# Patient Record
Sex: FEMALE | Race: WHITE | Employment: FULL TIME
[De-identification: names, ages, dates, MRNs, and addresses within clinical notes are randomized per-mention and may not be internally consistent; named-entity substitution may affect disease eponyms.]

---

## 2017-01-04 ENCOUNTER — OFFICE VISIT (OUTPATIENT)
Dept: OBGYN | Facility: CLINIC | Age: 37
End: 2017-01-04

## 2017-01-04 VITALS
HEART RATE: 73 BPM | WEIGHT: 168 LBS | BODY MASS INDEX: 29.77 KG/M2 | HEIGHT: 63 IN | SYSTOLIC BLOOD PRESSURE: 121 MMHG | DIASTOLIC BLOOD PRESSURE: 83 MMHG

## 2017-01-04 DIAGNOSIS — Z01.419 ENCOUNTER FOR GYNECOLOGICAL EXAMINATION WITHOUT ABNORMAL FINDING: Primary | ICD-10-CM

## 2017-01-04 PROCEDURE — 99385 PREV VISIT NEW AGE 18-39: CPT | Performed by: OBSTETRICS & GYNECOLOGY

## 2017-01-04 RX ORDER — DOCUSATE SODIUM 100 MG/1
100 CAPSULE, LIQUID FILLED ORAL 2 TIMES DAILY
COMMUNITY

## 2017-01-04 RX ORDER — LORATADINE 10 MG/1
10 TABLET ORAL DAILY
COMMUNITY
End: 2020-11-09

## 2017-01-04 ASSESSMENT — ENCOUNTER SYMPTOMS
CONSTIPATION: 1
SHORTNESS OF BREATH: 0
EYE PAIN: 0
DIARRHEA: 1
ABDOMINAL PAIN: 0
COUGH: 0
EYE DISCHARGE: 0

## 2017-01-05 ENCOUNTER — TELEPHONE (OUTPATIENT)
Dept: OBGYN | Facility: CLINIC | Age: 37
End: 2017-01-05

## 2017-01-23 ENCOUNTER — TELEPHONE (OUTPATIENT)
Dept: OBGYN | Facility: CLINIC | Age: 37
End: 2017-01-23

## 2017-03-13 ENCOUNTER — EMPLOYEE WELLNESS (OUTPATIENT)
Dept: OTHER | Age: 37
End: 2017-03-13

## 2017-03-13 LAB
CHOLESTEROL/HDL RATIO: 3.5
CHOLESTEROL: 123 MG/DL
GLUCOSE BLD-MCNC: 101 MG/DL (ref 70–99)
HDLC SERPL-MCNC: 35 MG/DL
LDL CHOLESTEROL: 76 MG/DL (ref 0–130)
PATIENT FASTING?: YES
TRIGL SERPL-MCNC: 61 MG/DL
VLDLC SERPL CALC-MCNC: 12 MG/DL (ref 1–30)

## 2017-04-05 ENCOUNTER — OFFICE VISIT (OUTPATIENT)
Dept: OBGYN CLINIC | Age: 37
End: 2017-04-05

## 2017-04-05 ENCOUNTER — HOSPITAL ENCOUNTER (OUTPATIENT)
Age: 37
Setting detail: SPECIMEN
Discharge: HOME | End: 2017-04-05
Payer: COMMERCIAL

## 2017-04-05 VITALS
SYSTOLIC BLOOD PRESSURE: 118 MMHG | BODY MASS INDEX: 26.93 KG/M2 | WEIGHT: 152 LBS | HEIGHT: 63 IN | DIASTOLIC BLOOD PRESSURE: 84 MMHG | HEART RATE: 64 BPM

## 2017-04-05 DIAGNOSIS — R87.615 ENCOUNTER FOR REPEAT PAP SMEAR DUE TO PREVIOUS INSUFFICIENT CERVICAL CELLS: Primary | ICD-10-CM

## 2017-04-05 RX ORDER — MEDROXYPROGESTERONE ACETATE 150 MG/ML
150 INJECTION, SUSPENSION INTRAMUSCULAR ONCE
Qty: 1 ML | Refills: 3
Start: 2017-04-05 | End: 2017-04-06 | Stop reason: SDUPTHER

## 2017-04-05 ASSESSMENT — ENCOUNTER SYMPTOMS
ABDOMINAL PAIN: 0
SHORTNESS OF BREATH: 0
CONSTIPATION: 0
EYE PAIN: 0
COUGH: 0
PHOTOPHOBIA: 0

## 2017-04-06 RX ORDER — MEDROXYPROGESTERONE ACETATE 150 MG/ML
150 INJECTION, SUSPENSION INTRAMUSCULAR ONCE
Qty: 1 ML | Refills: 3 | Status: SHIPPED | OUTPATIENT
Start: 2017-04-06 | End: 2018-04-06 | Stop reason: SDUPTHER

## 2017-04-07 LAB — CYTOLOGY REPORT: NORMAL

## 2017-06-06 ENCOUNTER — TELEPHONE (OUTPATIENT)
Dept: OBGYN CLINIC | Age: 37
End: 2017-06-06

## 2018-03-20 VITALS — WEIGHT: 153 LBS | BODY MASS INDEX: 27.1 KG/M2

## 2018-04-06 ENCOUNTER — HOSPITAL ENCOUNTER (OUTPATIENT)
Age: 38
Setting detail: SPECIMEN
Discharge: HOME | End: 2018-04-06
Payer: COMMERCIAL

## 2018-04-06 ENCOUNTER — OFFICE VISIT (OUTPATIENT)
Dept: OBGYN CLINIC | Age: 38
End: 2018-04-06
Payer: COMMERCIAL

## 2018-04-06 VITALS
SYSTOLIC BLOOD PRESSURE: 125 MMHG | HEART RATE: 78 BPM | WEIGHT: 150 LBS | HEIGHT: 63 IN | DIASTOLIC BLOOD PRESSURE: 79 MMHG | BODY MASS INDEX: 26.58 KG/M2

## 2018-04-06 DIAGNOSIS — Z01.419 WOMEN'S ANNUAL ROUTINE GYNECOLOGICAL EXAMINATION: Primary | ICD-10-CM

## 2018-04-06 PROCEDURE — 99395 PREV VISIT EST AGE 18-39: CPT | Performed by: OBSTETRICS & GYNECOLOGY

## 2018-04-06 RX ORDER — MEDROXYPROGESTERONE ACETATE 150 MG/ML
150 INJECTION, SUSPENSION INTRAMUSCULAR ONCE
Qty: 1 ML | Refills: 3 | Status: SHIPPED | OUTPATIENT
Start: 2018-04-06 | End: 2020-11-09 | Stop reason: ALTCHOICE

## 2018-04-23 LAB — CYTOLOGY REPORT: NORMAL

## 2019-05-21 ENCOUNTER — OFFICE VISIT (OUTPATIENT)
Dept: OBGYN CLINIC | Age: 39
End: 2019-05-21
Payer: COMMERCIAL

## 2019-05-21 VITALS
WEIGHT: 160 LBS | HEIGHT: 63 IN | DIASTOLIC BLOOD PRESSURE: 78 MMHG | SYSTOLIC BLOOD PRESSURE: 117 MMHG | HEART RATE: 66 BPM | BODY MASS INDEX: 28.35 KG/M2

## 2019-05-21 DIAGNOSIS — Z12.31 ENCOUNTER FOR SCREENING MAMMOGRAM FOR BREAST CANCER: Primary | ICD-10-CM

## 2019-05-21 PROCEDURE — 99396 PREV VISIT EST AGE 40-64: CPT | Performed by: OBSTETRICS & GYNECOLOGY

## 2019-05-21 ASSESSMENT — ENCOUNTER SYMPTOMS
COUGH: 0
SHORTNESS OF BREATH: 0
BACK PAIN: 0
ABDOMINAL PAIN: 0

## 2019-05-21 NOTE — PROGRESS NOTES
[facility] PHYSICIANS  [facility] - Verito Gold [address] [place] [id number]  Dept: [phone]    Chief complaint:   Chief Complaint   Patient presents with    Gynecologic Exam     prev pap 2018, neg       History Present Illness: Lucho Lama is a 36 yo female who presents for her annual exam.  She has been on depo and wants to stop it. She has had some nausea and breast tenderness with spotting. She has been lethargic as well with some depression and she associates it with the depo as well. She isn't sure what she would like to do for her heavy periods, but knows that she doesn't want depo. She is occasionally sexually active. Her boyfriend lives in [place] so they are seeing each other long distance. She denies any vaginal discharge. She denies any bowel or bladder issues. Current Medications (OTC/Herbal):   Current Outpatient Medications   Medication Sig Dispense Refill    loratadine (CLARITIN) 10 MG tablet Take 10 mg by mouth daily      docusate sodium (COLACE) 100 MG capsule Take 100 mg by mouth 2 times daily      medroxyPROGESTERone (DEPO-PROVERA) 150 MG/ML injection Inject 1 mL into the muscle once for 1 dose 1 mL 3     No current facility-administered medications for this visit. Allergies: Allergies   Allergen Reactions    Pcn [Penicillins] Hives     Past Medical History: No past medical history on file.   Past Surgical History:   Past Surgical History:   Procedure Laterality Date    APPENDECTOMY      WISDOM TOOTH EXTRACTION       Obstetric History:   3  Para 3  Gynecologic History: LMP irregular with depo, just started 2 days ago  Last Pap: 18       Any history of abnormal paps no    PriorColpo/Biopsy n/a     Last Mammogram hasn't had one  Contraception: depo, doesn't want anymore  Complications: none  STDs: none  Psychosocial History: Occupation:   Works for [employer]   Caffeine No    At risk for depression No    Abuse:   No  Seatbelt:   Yes  Exercise: No    Social History     Socioeconomic History    Marital status:      Spouse name: Not on file    Number of children: Not on file    Years of education: Not on file    Highest education level: Not on file   Occupational History    Not on file   Social Needs    Financial resource strain: Not on file    Food insecurity:     Worry: Not on file     Inability: Not on file    Transportation needs:     Medical: Not on file     Non-medical: Not on file   Tobacco Use    Smoking status: Never Smoker    Smokeless tobacco: Never Used   Substance and Sexual Activity    Alcohol use: No    Drug use: No    Sexual activity: Not Currently     Partners: Male     Birth control/protection: Injection   Lifestyle    Physical activity:     Days per week: Not on file     Minutes per session: Not on file    Stress: Not on file   Relationships    Social connections:     Talks on phone: Not on file     Gets together: Not on file     Attends [employer] service: Not on file     Active member of club or organization: Not on file     Attends meetings of clubs or organizations: Not on file     Relationship status: Not on file    Intimate partner violence:     Fear of current or ex partner: Not on file     Emotionally abused: Not on file     Physically abused: Not on file     Forced sexual activity: Not on file   Other Topics Concern    Not on file   Social History Narrative    Not on file       Family History   Problem Relation Age of Onset    Hypertension Mother     Heart Disease Maternal Grandmother        Review of Systems:   Review of Systems   Constitutional: Negative for chills and fever. HENT: Negative for congestion. Respiratory: Negative for cough and shortness of breath. Cardiovascular: Negative for chest pain and palpitations. Gastrointestinal: Negative for abdominal pain. Genitourinary: Negative for dyspareunia and menstrual problem. Musculoskeletal: Negative for back pain.    Neurological: Negative for dizziness and light-headedness. Psychiatric/Behavioral: The patient is not nervous/anxious. Physical exam:  vitals:  Height   5  ft    3 in,  Weight    160 lbs,   117/78 BP  Gen: alert, no apparent distress  HEENT:No pathologic skin lesions noted,NC/AT,PERRL, normal midline nontender thyroid   Lung Exam: Clear to auscultation in all fields bilaterally, without wheezes,rales or rhonchi. Cardiac Exam: Normal sinus rhythm andrate, without murmurs, rubs or gallops appreciated. Breast Exam: Symmetric without pathological skin changes, nontender without discrete suspicious masses palpated, supraclavicular or axillary adenopathy or nippledischarge noted. Abdominal Exam: Nontender to deep palpation without organomegaly, masses or CVAT appreciated, BS positive. No spinal deformation or tenderness. External Genitalia: Normal development without vulvar,vaginal or cervical lesions noted. Normal vaginal discharge, uterus anterior, 4-6 weeks without CMT. Adnexa nontender without abnormal masses bilaterally. Rectal Exam: Omitted. Extremities: Nontender without clubbing, cyanosis or edema. F.R.O.M. Neurologic Exam: Grossly intact without noted sensorimotor deficits and oriented x 3. Assessment/Plan:   Unremarkable annual Gyn exam.    Cervical Cytology Evaluation begins at 24years old. If Negative Cytology, Follow-up screening per current guidelines. Mammograms every 1year. If 36 yo and last mammogram was negative. Birth control and barrier recommendations discussed. STD counseling and prevention reviewed. Routine health maintenance per patients PCP.   Pt to follow up for annual exam in 1 year     Maciej Casanova MD  [address]

## 2019-11-19 ENCOUNTER — IMMUNIZATION (OUTPATIENT)
Dept: LAB | Age: 39
End: 2019-11-19
Payer: COMMERCIAL

## 2019-11-19 PROCEDURE — 90686 IIV4 VACC NO PRSV 0.5 ML IM: CPT | Performed by: FAMILY MEDICINE

## 2019-11-19 PROCEDURE — 90471 IMMUNIZATION ADMIN: CPT | Performed by: FAMILY MEDICINE

## 2020-11-09 ENCOUNTER — VIRTUAL VISIT (OUTPATIENT)
Dept: FAMILY MEDICINE CLINIC | Age: 40
End: 2020-11-09
Payer: COMMERCIAL

## 2020-11-09 PROCEDURE — 99202 OFFICE O/P NEW SF 15 MIN: CPT | Performed by: FAMILY MEDICINE

## 2020-11-09 RX ORDER — CETIRIZINE HYDROCHLORIDE 10 MG/1
10 TABLET ORAL DAILY
COMMUNITY

## 2020-11-09 ASSESSMENT — ENCOUNTER SYMPTOMS
VOMITING: 0
EYE DISCHARGE: 0
CONSTIPATION: 0
TROUBLE SWALLOWING: 0
EYE REDNESS: 0
ABDOMINAL PAIN: 0
NAUSEA: 0
WHEEZING: 0
DIARRHEA: 0
SORE THROAT: 0
SINUS PRESSURE: 0
COUGH: 0
RHINORRHEA: 0
SHORTNESS OF BREATH: 0

## 2020-11-09 NOTE — PROGRESS NOTES
2020    TELEHEALTH EVALUATION -- Audio/Visual (During PSHIN-43 public health emergency)    HPI:    Reather Cranker (:  1980) has requested an audio/video evaluation for the following concern(s):    Reason for needing testing: Patient has had some mild congestion but has had it for awhile.  just found out that he was positive for COVID. She will need testing to determine ability to continue to work. Patient is seen today via video visit to discuss testing for COVID. We did discuss potential symptoms of COVID including cough, shortness of breath, fever, chills, body aches, headache, rashes, fatigue, nausea, vomiting, diarrhea and they are not having any of these listed symptoms. Did recommend that patient stay quarantined at home until testing reports are available. Review of Systems   Constitutional: Negative for chills, fatigue and fever. HENT: Negative for congestion, ear pain, postnasal drip, rhinorrhea, sinus pressure, sore throat and trouble swallowing. Eyes: Negative for discharge and redness. Respiratory: Negative for cough, shortness of breath and wheezing. Cardiovascular: Negative for chest pain. Gastrointestinal: Negative for abdominal pain, constipation, diarrhea, nausea and vomiting. Genitourinary: Negative for dysuria, flank pain, frequency and urgency. Musculoskeletal: Negative for arthralgias, myalgias and neck pain. Skin: Negative for rash and wound. Allergic/Immunologic: Negative for environmental allergies. Neurological: Negative for dizziness, weakness, light-headedness and headaches. Hematological: Negative for adenopathy. Psychiatric/Behavioral: Negative. Prior to Visit Medications    Medication Sig Taking?  Authorizing Provider   cetirizine (ZYRTEC) 10 MG tablet Take 10 mg by mouth daily Yes Historical Provider, MD   docusate sodium (COLACE) 100 MG capsule Take 100 mg by mouth 2 times daily Yes Historical Provider, MD       Social History     Tobacco Use    Smoking status: Never Smoker    Smokeless tobacco: Never Used   Substance Use Topics    Alcohol use: No    Drug use: No        Allergies   Allergen Reactions    Pcn [Penicillins] Hives   ,   History reviewed. No pertinent past medical history. ,   Past Surgical History:   Procedure Laterality Date    APPENDECTOMY      WISDOM TOOTH EXTRACTION         Physical Exam  Vitals signs and nursing note reviewed. Constitutional:       General: She is not in acute distress. Appearance: Normal appearance. HENT:      Head: Normocephalic and atraumatic. Right Ear: External ear normal.      Left Ear: External ear normal.      Nose: Nose normal. No congestion or rhinorrhea. Mouth/Throat:      Mouth: Mucous membranes are moist.   Eyes:      General:         Right eye: No discharge. Left eye: No discharge. Extraocular Movements: Extraocular movements intact. Conjunctiva/sclera: Conjunctivae normal.   Neck:      Musculoskeletal: Normal range of motion. Pulmonary:      Effort: Pulmonary effort is normal.   Skin:     Findings: No erythema or rash. Neurological:      Mental Status: She is alert and oriented to person, place, and time. Mental status is at baseline. ASSESSMENT/PLAN:  Encounter Diagnosis   Name Primary?  Exposure to COVID-19 virus Yes     No orders of the defined types were placed in this encounter. Orders Placed This Encounter   Procedures    COVID-19 Ambulatory     Rapid COVID test per workmed     Standing Status:   Future     Standing Expiration Date:   11/9/2021     Scheduling Instructions:      Saline media preferred given current shortage of viral transport media but both acceptable     Order Specific Question:   Is this test for diagnosis or screening? Answer:   Screening     Order Specific Question:   Symptomatic for COVID-19 as defined by CDC?      Answer:   No     Order Specific Question:   Date of Symptom Onset Answer:   N/A     Order Specific Question:   Hospitalized for COVID-19? Answer:   No     Order Specific Question:   Admitted to ICU for COVID-19? Answer:   No     Order Specific Question:   Employed in healthcare setting? Answer:   Yes     Order Specific Question:   Resident in a congregate (group) care setting? Answer:   No     Order Specific Question:   Pregnant? Answer:   No     Order Specific Question:   Previously tested for COVID-19? Answer:   No     Will await rapid test report before determining ability to continue work duties. Testing is ordered as noted and did schedule patient to come in to get testing performed. We will notify patient when report is available. In the interim would recommend quarantining for 10 days or until testing reports are available. If patient does develop symptoms at any time they should continue to remain quarantined until 10 days after the onset of symptoms and 24 hours symptom-free. Is advised that if worsening symptoms or concerning symptoms develop they should come in for further assessment. (Please note that portions of this note were completed with a voice recognition program. Efforts were made to edit the dictations but occasionally words are mis-transcribed.)        Carito Clark is a 38 y.o. female being evaluated by a Virtual Visit (video visit) encounter to address concerns as mentioned above. A caregiver was present when appropriate. Due to this being a TeleHealth encounter (During [place] [address] public health emergency), evaluation of the following organ systems was limited: Vitals/Constitutional/EENT/Resp/CV/GI//MS/Neuro/Skin/Heme-Lymph-Imm.   Pursuant to the emergency declaration under the [facility], [facility] waiver authority and the [employer] and [employer] Act, this Virtual Visit was conducted with patient's (and/or legal guardian's) consent, to reduce the patient's risk of exposure to COVID-19 and provide necessary medical care. The patient (and/or legal guardian) has also been advised to contact this office for worsening conditions or problems, and seek emergency medical treatment and/or call 911 if deemed necessary. Patient identification was verified at the start of the visit: Yes    Total time spent on this encounter: Not billed by time    Services were provided through a video synchronous discussion virtually to substitute for in-person clinic visit. Patient was in their home setting on their mobile device and I was in my office on a secured video interface. --Yolanda Stapleton DO on 11/9/2020 at 12:29 PM    An electronic signature was used to authenticate this note.

## 2020-11-10 ENCOUNTER — TELEPHONE (OUTPATIENT)
Dept: ADMINISTRATIVE | Age: 40
End: 2020-11-10

## 2020-11-17 ENCOUNTER — VIRTUAL VISIT (OUTPATIENT)
Dept: FAMILY MEDICINE CLINIC | Age: 40
End: 2020-11-17

## 2020-11-17 PROCEDURE — 99213 OFFICE O/P EST LOW 20 MIN: CPT | Performed by: FAMILY MEDICINE

## 2020-11-17 ASSESSMENT — ENCOUNTER SYMPTOMS
CONSTIPATION: 0
COUGH: 1
SORE THROAT: 0
DIARRHEA: 0
TROUBLE SWALLOWING: 0
EYE DISCHARGE: 0
RHINORRHEA: 1
EYE REDNESS: 0
SINUS PRESSURE: 0
NAUSEA: 0
VOMITING: 0
SHORTNESS OF BREATH: 0
WHEEZING: 0
ABDOMINAL PAIN: 0

## 2020-11-17 ASSESSMENT — PATIENT HEALTH QUESTIONNAIRE - PHQ9
SUM OF ALL RESPONSES TO PHQ QUESTIONS 1-9: 0
1. LITTLE INTEREST OR PLEASURE IN DOING THINGS: 0
SUM OF ALL RESPONSES TO PHQ9 QUESTIONS 1 & 2: 0
SUM OF ALL RESPONSES TO PHQ QUESTIONS 1-9: 0
SUM OF ALL RESPONSES TO PHQ QUESTIONS 1-9: 0
2. FEELING DOWN, DEPRESSED OR HOPELESS: 0

## 2020-11-17 NOTE — LETTER
[facility] department of [place] [address]  Phone: [phone]  Fax: Benjie Chamberlain,       November 17, 2020    Patient:   Cortney Meyers  Date of Birth   1980  Date of visit   11/17/2020        To Whom it May Concern:      Cortney Meyers was seen in my clinic on 11/17/2020. She may return to work 11/28/2020 without any restrictions. If you have any questions or concerns, please don't hesitate to call.       Sincerely,      Magi Martell, DO

## 2020-11-17 NOTE — PROGRESS NOTES
2020    TELEHEALTH EVALUATION -- Audio/Visual (During QHNMY-69 public health emergency)    HPI:    Linda Martínez (:  1980) has requested an audio/video evaluation for the following concern(s):    HPI    Patient needs clearance to return to work duties. Patient started having Covid symptoms on . Primarily it started as just mild sinus congestion and she did not think much of it but then her  had tested positive for Covid and we ultimately had tested her on  which came back positive. She has been off work since that time. Did get a little bit more congestion and lost her taste and smell. Had had some intermittent dizziness throughout this which is unusual for her and she notes that she has had some sweating to her palms but otherwise just feels little bit tired and rundown. Has not had any shortness of breath or significant cough. Actually her symptoms are much better overall. Health department did tell her that she would be cleared for work duties as of  and as it has been past the 10-day window and her symptoms have improved significantly I do think she is cleared to return to work duties. Review of Systems   Constitutional: Positive for fatigue. Negative for chills and fever. HENT: Positive for congestion, postnasal drip and rhinorrhea. Negative for ear pain, sinus pressure, sore throat and trouble swallowing. Eyes: Negative for discharge and redness. Respiratory: Positive for cough (slight cough from post nasal drainage). Negative for shortness of breath and wheezing. Cardiovascular: Negative for chest pain. Gastrointestinal: Negative for abdominal pain, constipation, diarrhea, nausea and vomiting. Genitourinary: Negative for dysuria, flank pain, frequency and urgency. Musculoskeletal: Negative for arthralgias, myalgias and neck pain. Skin: Negative for rash and wound. Allergic/Immunologic: Negative for environmental allergies. Neurological: Positive for dizziness (intermittent). Negative for weakness, light-headedness and headaches. Hematological: Negative for adenopathy. Psychiatric/Behavioral: Negative. Prior to Visit Medications    Medication Sig Taking? Authorizing Provider   cetirizine (ZYRTEC) 10 MG tablet Take 10 mg by mouth daily Yes Historical Provider, MD   docusate sodium (COLACE) 100 MG capsule Take 100 mg by mouth 2 times daily Yes Historical Provider, MD       Social History     Tobacco Use    Smoking status: Never Smoker    Smokeless tobacco: Never Used   Substance Use Topics    Alcohol use: No    Drug use: No        Allergies   Allergen Reactions    Pcn [Penicillins] Hives   ,   History reviewed. No pertinent past medical history. ,   Past Surgical History:   Procedure Laterality Date    APPENDECTOMY      WISDOM TOOTH EXTRACTION         Physical Exam  Vitals signs and nursing note reviewed. Constitutional:       General: She is not in acute distress. Appearance: Normal appearance. HENT:      Head: Normocephalic and atraumatic. Right Ear: External ear normal.      Left Ear: External ear normal.      Nose: Nose normal. No congestion or rhinorrhea. Mouth/Throat:      Mouth: Mucous membranes are moist.   Eyes:      General:         Right eye: No discharge. Left eye: No discharge. Extraocular Movements: Extraocular movements intact. Conjunctiva/sclera: Conjunctivae normal.   Neck:      Musculoskeletal: Normal range of motion. Pulmonary:      Effort: Pulmonary effort is normal.   Skin:     Findings: No erythema or rash. Neurological:      Mental Status: She is alert and oriented to person, place, and time. Mental status is at baseline. ASSESSMENT/PLAN:    Encounter Diagnosis   Name Primary?  COVID-19 Yes     Patient will be cleared to return to work duties on November 18. Symptoms have improved and is past her 10-day window since symptoms started. In the interim should just continue with symptomatic treatment of increased fluids and rest.  Can use Tylenol if needed. Return  if no improvement in symptoms or if any further symptoms arise. (Please note that portions of this note were completed with a voice recognition program. Efforts were made to edit the dictations but occasionally words are mis-transcribed.)        No follow-ups on file. Jesús Choi is a 38 y.o. female being evaluated by a Virtual Visit (video visit) encounter to address concerns as mentioned above. A caregiver was present when appropriate. Due to this being a TeleHealth encounter (During YHZMJ-85 public health emergency), evaluation of the following organ systems was limited: Vitals/Constitutional/EENT/Resp/CV/GI//MS/Neuro/Skin/Heme-Lymph-Imm. Pursuant to the emergency declaration under the [address] [place] [address], [address] [place] [address] authority and the [employer] and [employer] Act, this Virtual Visit was conducted with patient's (and/or legal guardian's) consent, to reduce the patient's risk of exposure to COVID-19 and provide necessary medical care. The patient (and/or legal guardian) has also been advised to contact this office for worsening conditions or problems, and seek emergency medical treatment and/or call 911 if deemed necessary. Patient identification was verified at the start of the visit: Yes    Total time spent on this encounter: Not billed by time    Services were provided through a video synchronous discussion virtually to substitute for in-person clinic visit. Patient was in their home setting on their mobile device and I was in my office on a secured video interface. --Nancy James DO on 11/17/2020 at 10:00 AM    An electronic signature was used to authenticate this note.

## 2020-11-17 NOTE — LETTER
[facility] department of [facility] [place]  Phone: [phone]  Fax: Benjie Chamberlain,       November 17, 2020    Patient:   Lazarus Griffon  Date of Birth   1980  Date of visit   11/17/2020        To Whom it May Concern:      Lazarus Griffon was seen in my clinic on 11/17/2020. Please excuse from work 11/28/20 without any restrictions. If you have any questions or concerns, please don't hesitate to call.       Sincerely,      Blaire Sim DO

## 2021-12-28 ENCOUNTER — HOSPITAL ENCOUNTER (OUTPATIENT)
Dept: LAB | Age: 41
Discharge: HOME | End: 2021-12-28
Payer: COMMERCIAL

## 2021-12-28 ENCOUNTER — OFFICE VISIT (OUTPATIENT)
Dept: PRIMARY CARE CLINIC | Age: 41
End: 2021-12-28
Payer: COMMERCIAL

## 2021-12-28 VITALS
HEIGHT: 66 IN | BODY MASS INDEX: 28.28 KG/M2 | SYSTOLIC BLOOD PRESSURE: 138 MMHG | TEMPERATURE: 97 F | DIASTOLIC BLOOD PRESSURE: 78 MMHG | WEIGHT: 176 LBS | OXYGEN SATURATION: 99 % | HEART RATE: 68 BPM

## 2021-12-28 DIAGNOSIS — R53.83 FATIGUE, UNSPECIFIED TYPE: ICD-10-CM

## 2021-12-28 DIAGNOSIS — R68.89 COLD SWEAT: Primary | ICD-10-CM

## 2021-12-28 DIAGNOSIS — Z13.31 POSITIVE DEPRESSION SCREENING: ICD-10-CM

## 2021-12-28 LAB
ABSOLUTE EOS #: 0.16 K/UL (ref 0–0.44)
ABSOLUTE IMMATURE GRANULOCYTE: <0.03 K/UL (ref 0–0.3)
ABSOLUTE LYMPH #: 1.27 K/UL (ref 1.1–3.7)
ABSOLUTE MONO #: 0.38 K/UL (ref 0.1–1.2)
ALBUMIN SERPL-MCNC: 4.8 G/DL (ref 3.5–5.2)
ALBUMIN/GLOBULIN RATIO: 1.7 (ref 1–2.5)
ALP BLD-CCNC: 54 U/L (ref 35–104)
ALT SERPL-CCNC: 13 U/L (ref 5–33)
ANION GAP SERPL CALCULATED.3IONS-SCNC: 12 MMOL/L (ref 9–17)
AST SERPL-CCNC: 15 U/L
BASOPHILS # BLD: 0 % (ref 0–2)
BASOPHILS ABSOLUTE: <0.03 K/UL (ref 0–0.2)
BILIRUB SERPL-MCNC: 0.4 MG/DL (ref 0.3–1.2)
BUN BLDV-MCNC: 11 MG/DL (ref 6–20)
BUN/CREAT BLD: 16 (ref 9–20)
CALCIUM SERPL-MCNC: 9.6 MG/DL (ref 8.6–10.4)
CHLORIDE BLD-SCNC: 102 MMOL/L (ref 98–107)
CHP ED QC CHECK: NORMAL
CO2: 24 MMOL/L (ref 20–31)
CREAT SERPL-MCNC: 0.68 MG/DL (ref 0.5–0.9)
DIFFERENTIAL TYPE: ABNORMAL
EOSINOPHILS RELATIVE PERCENT: 3 % (ref 1–4)
GFR AFRICAN AMERICAN: >60 ML/MIN
GFR NON-AFRICAN AMERICAN: >60 ML/MIN
GFR SERPL CREATININE-BSD FRML MDRD: NORMAL ML/MIN/{1.73_M2}
GFR SERPL CREATININE-BSD FRML MDRD: NORMAL ML/MIN/{1.73_M2}
GLUCOSE BLD-MCNC: 86 MG/DL
GLUCOSE BLD-MCNC: 95 MG/DL (ref 70–99)
HCG QUALITATIVE: NEGATIVE
HCT VFR BLD CALC: 41.1 % (ref 36.3–47.1)
HEMOGLOBIN: 12.9 G/DL (ref 11.9–15.1)
IMMATURE GRANULOCYTES: 0 %
LYMPHOCYTES # BLD: 24 % (ref 24–43)
MCH RBC QN AUTO: 29.6 PG (ref 25.2–33.5)
MCHC RBC AUTO-ENTMCNC: 31.4 G/DL (ref 25.2–33.5)
MCV RBC AUTO: 94.3 FL (ref 82.6–102.9)
MONOCYTES # BLD: 7 % (ref 3–12)
NRBC AUTOMATED: 0 PER 100 WBC
PDW BLD-RTO: 12.3 % (ref 11.8–14.4)
PLATELET # BLD: 244 K/UL (ref 138–453)
PLATELET ESTIMATE: ABNORMAL
PMV BLD AUTO: 11.1 FL (ref 8.1–13.5)
POTASSIUM SERPL-SCNC: 3.9 MMOL/L (ref 3.7–5.3)
RBC # BLD: 4.36 M/UL (ref 3.95–5.11)
RBC # BLD: ABNORMAL 10*6/UL
SEG NEUTROPHILS: 66 % (ref 36–65)
SEGMENTED NEUTROPHILS ABSOLUTE COUNT: 3.54 K/UL (ref 1.5–8.1)
SODIUM BLD-SCNC: 138 MMOL/L (ref 135–144)
TOTAL PROTEIN: 7.7 G/DL (ref 6.4–8.3)
TSH SERPL DL<=0.05 MIU/L-ACNC: 2.29 MIU/L (ref 0.3–5)
WBC # BLD: 5.4 K/UL (ref 3.5–11.3)
WBC # BLD: ABNORMAL 10*3/UL

## 2021-12-28 PROCEDURE — G8431 POS CLIN DEPRES SCRN F/U DOC: HCPCS | Performed by: FAMILY MEDICINE

## 2021-12-28 PROCEDURE — 83036 HEMOGLOBIN GLYCOSYLATED A1C: CPT

## 2021-12-28 PROCEDURE — 82962 GLUCOSE BLOOD TEST: CPT | Performed by: FAMILY MEDICINE

## 2021-12-28 PROCEDURE — 36415 COLL VENOUS BLD VENIPUNCTURE: CPT

## 2021-12-28 PROCEDURE — 84443 ASSAY THYROID STIM HORMONE: CPT

## 2021-12-28 PROCEDURE — 80053 COMPREHEN METABOLIC PANEL: CPT

## 2021-12-28 PROCEDURE — 85025 COMPLETE CBC W/AUTO DIFF WBC: CPT

## 2021-12-28 PROCEDURE — 99214 OFFICE O/P EST MOD 30 MIN: CPT | Performed by: FAMILY MEDICINE

## 2021-12-28 PROCEDURE — 84703 CHORIONIC GONADOTROPIN ASSAY: CPT

## 2021-12-28 RX ORDER — POLYETHYLENE GLYCOL 3350 17 G/17G
17 POWDER, FOR SOLUTION ORAL DAILY PRN
COMMUNITY

## 2021-12-28 ASSESSMENT — PATIENT HEALTH QUESTIONNAIRE - PHQ9
4. FEELING TIRED OR HAVING LITTLE ENERGY: 2
SUM OF ALL RESPONSES TO PHQ9 QUESTIONS 1 & 2: 3
6. FEELING BAD ABOUT YOURSELF - OR THAT YOU ARE A FAILURE OR HAVE LET YOURSELF OR YOUR FAMILY DOWN: 1
8. MOVING OR SPEAKING SO SLOWLY THAT OTHER PEOPLE COULD HAVE NOTICED. OR THE OPPOSITE, BEING SO FIGETY OR RESTLESS THAT YOU HAVE BEEN MOVING AROUND A LOT MORE THAN USUAL: 0
SUM OF ALL RESPONSES TO PHQ QUESTIONS 1-9: 10
3. TROUBLE FALLING OR STAYING ASLEEP: 2
9. THOUGHTS THAT YOU WOULD BE BETTER OFF DEAD, OR OF HURTING YOURSELF: 0
5. POOR APPETITE OR OVEREATING: 1
7. TROUBLE CONCENTRATING ON THINGS, SUCH AS READING THE NEWSPAPER OR WATCHING TELEVISION: 1
SUM OF ALL RESPONSES TO PHQ QUESTIONS 1-9: 10
SUM OF ALL RESPONSES TO PHQ QUESTIONS 1-9: 10
10. IF YOU CHECKED OFF ANY PROBLEMS, HOW DIFFICULT HAVE THESE PROBLEMS MADE IT FOR YOU TO DO YOUR WORK, TAKE CARE OF THINGS AT HOME, OR GET ALONG WITH OTHER PEOPLE: 0
1. LITTLE INTEREST OR PLEASURE IN DOING THINGS: 1
2. FEELING DOWN, DEPRESSED OR HOPELESS: 2

## 2021-12-28 ASSESSMENT — COLUMBIA-SUICIDE SEVERITY RATING SCALE - C-SSRS
2. HAVE YOU ACTUALLY HAD ANY THOUGHTS OF KILLING YOURSELF?: NO
1. WITHIN THE PAST MONTH, HAVE YOU WISHED YOU WERE DEAD OR WISHED YOU COULD GO TO SLEEP AND NOT WAKE UP?: NO
6. HAVE YOU EVER DONE ANYTHING, STARTED TO DO ANYTHING, OR PREPARED TO DO ANYTHING TO END YOUR LIFE?: NO

## 2021-12-28 ASSESSMENT — ENCOUNTER SYMPTOMS
CHEST TIGHTNESS: 0
ABDOMINAL PAIN: 0
WHEEZING: 0
CONSTIPATION: 1
DIARRHEA: 0
SHORTNESS OF BREATH: 0
NAUSEA: 0
COUGH: 0

## 2021-12-28 NOTE — LETTER
[address] [facility] department of [place] [address]  Phone: [phone]  Fax: [phone]    Michaela Colorado MD        December 28, 2021     Patient: Kunal Stratton   YOB: 1980   Date of Visit: 12/28/2021       To Whom It May Concern: It is my medical opinion that Candida Beverly may return to work on 12/28/21. If you have any questions or concerns, please don't hesitate to call.     Sincerely,        Michaela Colorado MD

## 2021-12-28 NOTE — PROGRESS NOTES
[address] [address]  Dept: [phone]  Dept Fax: [phone]  Loc: [phone]    Nancy Can is a 42 y.o. female who presents today for her medical conditions/complaints as noted below. Nancy Can is c/o of   Chief Complaint   Patient presents with    Other     having cold and hot episodes and weak blood sugar 86       HPI:     HPI Here today for not feeling well. Over the past few days she has been feeling sort of hot. She was also told that she looked pale. She is on her period and she typically doesn't feel good during this time, but she usually would see improvement by now. Typically when she gets hot during her period it is just at night. No issues with chest pain or shortness of breath. No swelling in her legs. She has not had any issues with nausea, vomiting or diarrhea. She has some chronic constipation that is improved with miralax. No blood in the stool. No issues with her thyroid in the past. She has been having trouble with weight gain as well. No new supplements. She is currently on her period but she is not on birth control. She has been feeling more overwhelmed with work recently. They recently moved so she has been under a lot of current stress. Nothing overwhelming or bad. She has not had any panic attacks. She is typically more irritable during her period, but she feels like it is gradually worsening over the past few months. History reviewed. No pertinent past medical history.        Social History     Tobacco Use    Smoking status: Never Smoker    Smokeless tobacco: Never Used   Substance Use Topics    Alcohol use: No     Current Outpatient Medications   Medication Sig Dispense Refill    polyethylene glycol (MIRALAX) 17 g packet Take 17 g by mouth daily as needed for Constipation      cetirizine (ZYRTEC) 10 MG tablet Take 10 mg by mouth daily      or rash. Neurological:      Mental Status: She is alert and oriented to person, place, and time. Psychiatric:         Attention and Perception: Attention normal.         Mood and Affect: Mood is depressed. Speech: Speech normal.         Behavior: Behavior normal. Behavior is cooperative. Thought Content: Thought content normal.         Cognition and Memory: Cognition normal.         Judgment: Judgment normal.       /78 (Site: Left Upper Arm, Position: Sitting, Cuff Size: Large Adult)   Pulse 68   Temp 97 °F (36.1 °C) (Tympanic)   Ht 5' 6\" (1.676 m)   Wt 176 lb (79.8 kg)   LMP 12/25/2021   SpO2 99%   BMI 28.41 kg/m²     Assessment:       Diagnosis Orders   1. Cold sweat  POCT Glucose   2. Positive depression screening  Positive Screen for Clinical Depression with a Documented Follow-up Plan    3.  Fatigue, unspecified type  Comprehensive Metabolic Panel    CBC Auto Differential    TSH With Reflex Ft4    Hemoglobin A1C    hCG, Serum, Qualitative      Hospital Outpatient Visit on 12/28/2021   Component Date Value Ref Range Status    hCG Qual 12/28/2021 NEGATIVE  NEGATIVE Final    TSH 12/28/2021 2.29  0.30 - 5.00 mIU/L Final    WBC 12/28/2021 5.4  3.5 - 11.3 k/uL Final    RBC 12/28/2021 4.36  3.95 - 5.11 m/uL Final    Hemoglobin 12/28/2021 12.9  11.9 - 15.1 g/dL Final    Hematocrit 12/28/2021 41.1  36.3 - 47.1 % Final    MCV 12/28/2021 94.3  82.6 - 102.9 fL Final    MCH 12/28/2021 29.6  25.2 - 33.5 pg Final    MCHC 12/28/2021 31.4  25.2 - 33.5 g/dL Final    RDW 12/28/2021 12.3  11.8 - 14.4 % Final    Platelets 41/61/2659 244  138 - 453 k/uL Final    MPV 12/28/2021 11.1  8.1 - 13.5 fL Final    NRBC Automated 12/28/2021 0.0  0.0 per 100 WBC Final    Differential Type 12/28/2021 NOT REPORTED   Final    Seg Neutrophils 12/28/2021 66* 36 - 65 % Final    Lymphocytes 12/28/2021 24  24 - 43 % Final    Monocytes 12/28/2021 7  3 - 12 % Final    Eosinophils % 12/28/2021 3  1 - 4 % Final    Basophils 12/28/2021 0  0 - 2 % Final    Immature Granulocytes 12/28/2021 0  0 % Final    Segs Absolute 12/28/2021 3.54  1.50 - 8.10 k/uL Final    Absolute Lymph # 12/28/2021 1.27  1.10 - 3.70 k/uL Final    Absolute Mono # 12/28/2021 0.38  0.10 - 1.20 k/uL Final    Absolute Eos # 12/28/2021 0.16  0.00 - 0.44 k/uL Final    Basophils Absolute 12/28/2021 <0.03  0.00 - 0.20 k/uL Final    Absolute Immature Granulocyte 12/28/2021 <0.03  0.00 - 0.30 k/uL Final    WBC Morphology 12/28/2021 NOT REPORTED   Final    RBC Morphology 12/28/2021 NOT REPORTED   Final    Platelet Estimate 12/13/5376 NOT REPORTED   Final    Glucose 12/28/2021 95  70 - 99 mg/dL Final    BUN 12/28/2021 11  6 - 20 mg/dL Final    CREATININE 12/28/2021 0.68  0.50 - 0.90 mg/dL Final    Bun/Cre Ratio 12/28/2021 16  9 - 20 Final    Calcium 12/28/2021 9.6  8.6 - 10.4 mg/dL Final    Sodium 12/28/2021 138  135 - 144 mmol/L Final    Potassium 12/28/2021 3.9  3.7 - 5.3 mmol/L Final    Chloride 12/28/2021 102  98 - 107 mmol/L Final    CO2 12/28/2021 24  20 - 31 mmol/L Final    Anion Gap 12/28/2021 12  9 - 17 mmol/L Final    Alkaline Phosphatase 12/28/2021 54  35 - 104 U/L Final    ALT 12/28/2021 13  5 - 33 U/L Final    AST 12/28/2021 15  <32 U/L Final    Total Bilirubin 12/28/2021 0.40  0.3 - 1.2 mg/dL Final    Total Protein 12/28/2021 7.7  6.4 - 8.3 g/dL Final    Albumin 12/28/2021 4.8  3.5 - 5.2 g/dL Final    Albumin/Globulin Ratio 12/28/2021 1.7  1.0 - 2.5 Final    GFR Non- 12/28/2021 >60  >60 mL/min Final    GFR  12/28/2021 >60  >60 mL/min Final    GFR Comment 12/28/2021        Final    GFR Staging 12/28/2021 NOT REPORTED   Final   Office Visit on 12/28/2021   Component Date Value Ref Range Status    Glucose 12/28/2021 86  mg/dL Final            Plan:        I will check labs to look for anemia or thyroid dysfunction along with any other issues.  If her labs are normal I suspect she may be having a panic attack and at that point I will suggest buspar or propranolol to try to help her. Return if symptoms worsen or fail to improve. Orders Placed This Encounter   Procedures    Comprehensive Metabolic Panel     Standing Status:   Future     Number of Occurrences:   1     Standing Expiration Date:   12/28/2022    CBC Auto Differential     Standing Status:   Future     Number of Occurrences:   1     Standing Expiration Date:   12/28/2022    TSH With Reflex Ft4     Standing Status:   Future     Number of Occurrences:   1     Standing Expiration Date:   12/28/2022    Hemoglobin A1C     Standing Status:   Future     Number of Occurrences:   1     Standing Expiration Date:   12/28/2022    hCG, Serum, Qualitative     Standing Status:   Future     Number of Occurrences:   1     Standing Expiration Date:   12/28/2022    POCT Glucose    Positive Screen for Clinical Depression with a Documented Follow-up Plan          Patientgiven educational materials - see patient instructions. Discussed use, benefit,and side effects of prescribed medications. All patient questions answered. Ptvoiced understanding. Reviewed health maintenance. Instructed to continue currentmedications, diet and exercise. Patient agreed with treatment plan. Follow up asdirected.      Electronically signed by Abdifatah Cabrera MD on 12/28/2021 at 2:49 PM

## 2021-12-29 LAB
ESTIMATED AVERAGE GLUCOSE: 88 MG/DL
HBA1C MFR BLD: 4.7 % (ref 4–6)

## 2023-02-15 ENCOUNTER — HOSPITAL ENCOUNTER (OUTPATIENT)
Age: 43
Setting detail: SPECIMEN
Discharge: HOME | End: 2023-02-15

## 2023-02-15 ENCOUNTER — NURSE TRIAGE (OUTPATIENT)
Dept: OTHER | Facility: CLINIC | Age: 43
End: 2023-02-15

## 2023-02-15 ENCOUNTER — OFFICE VISIT (OUTPATIENT)
Dept: PRIMARY CARE CLINIC | Age: 43
End: 2023-02-15

## 2023-02-15 VITALS
WEIGHT: 175 LBS | BODY MASS INDEX: 28.25 KG/M2 | HEART RATE: 80 BPM | TEMPERATURE: 99 F | OXYGEN SATURATION: 100 % | SYSTOLIC BLOOD PRESSURE: 136 MMHG | DIASTOLIC BLOOD PRESSURE: 76 MMHG

## 2023-02-15 DIAGNOSIS — R14.0 ABDOMINAL BLOATING: ICD-10-CM

## 2023-02-15 DIAGNOSIS — Z76.89 ENCOUNTER TO ESTABLISH CARE: Primary | ICD-10-CM

## 2023-02-15 DIAGNOSIS — N93.9 VAGINAL BLEEDING: ICD-10-CM

## 2023-02-15 DIAGNOSIS — N94.9 VAGINAL DISCOMFORT: ICD-10-CM

## 2023-02-15 DIAGNOSIS — R10.30 LOWER ABDOMINAL PAIN: Primary | ICD-10-CM

## 2023-02-15 DIAGNOSIS — Z12.31 SCREENING MAMMOGRAM, ENCOUNTER FOR: ICD-10-CM

## 2023-02-15 DIAGNOSIS — R10.30 LOWER ABDOMINAL PAIN: ICD-10-CM

## 2023-02-15 LAB
BILIRUBIN, POC: NEGATIVE
BLOOD URINE, POC: NORMAL
CLARITY, POC: CLEAR
COLOR, POC: NORMAL
GLUCOSE URINE, POC: NEGATIVE
KETONES, POC: NEGATIVE
LEUKOCYTE EST, POC: NEGATIVE
NITRITE, POC: NEGATIVE
PH, POC: 7
PROTEIN, POC: NEGATIVE
SPECIFIC GRAVITY, POC: 1.02
UROBILINOGEN, POC: 0.2

## 2023-02-15 ASSESSMENT — ENCOUNTER SYMPTOMS
HEMATOCHEZIA: 0
NAUSEA: 1
DIARRHEA: 0
ABDOMINAL PAIN: 1
RECTAL PAIN: 1
VOMITING: 0
RESPIRATORY NEGATIVE: 1
BLOOD IN STOOL: 0
FLATUS: 1
CONSTIPATION: 1

## 2023-02-15 NOTE — PROGRESS NOTES
Pt has been having vaginal irritation for few months and some irreg spotting. Scheduled pt NP visit with [facility], Annual with , referral to Yolanda Aldridge PCP) and mammogram order given.

## 2023-02-15 NOTE — TELEPHONE ENCOUNTER
Location of patient: Jaxon Dunne call from [employer] at [employer] unsure which Market as [employer] was unsure with [employer]. Subjective: Caller states \"I have abdominal pain\"     Current Symptoms: No abdominal pain at time of call. Bloating and constipation on and off. Hemorrhoids. Pressure in her vaginal area today along with vaginal burning and bright red bleeding this morning with some clots. She isn't due for her cycle for another 10 days. Onset: 1 month ago; intermittent, worsening, waxing and waning    Associated Symptoms: constipation    Pain Severity: 4/10; pressure; intermittent    Temperature: denies fever     LMP:  last month  Pregnant: Unknown    Recommended disposition: See in Office Today    Care advice provided, patient verbalizes understanding; denies any other questions or concerns; instructed to call back for any new or worsening symptoms. Patient/Caller agrees with recommended disposition; writer provided warm transfer to [facility] for appointment scheduling. Attention Provider: Thank you for allowing me to participate in the care of your patient. The patient was connected to triage in response to information provided to the ECC/PSC. Please do not respond through this encounter as the response is not directed to a shared pool.     Reason for Disposition   Patient wants to be seen    Protocols used: Vaginal Bleeding - Abnormal-ADULT-[place]

## 2023-02-16 ENCOUNTER — HOSPITAL ENCOUNTER (OUTPATIENT)
Age: 43
Setting detail: SPECIMEN
Discharge: HOME | End: 2023-02-16

## 2023-02-16 ENCOUNTER — HOSPITAL ENCOUNTER (OUTPATIENT)
Dept: CT IMAGING | Age: 43
Discharge: HOME | End: 2023-02-18
Payer: COMMERCIAL

## 2023-02-16 DIAGNOSIS — N93.9 VAGINAL BLEEDING: ICD-10-CM

## 2023-02-16 DIAGNOSIS — R14.0 ABDOMINAL BLOATING: ICD-10-CM

## 2023-02-16 DIAGNOSIS — N94.9 VAGINAL DISCOMFORT: ICD-10-CM

## 2023-02-16 DIAGNOSIS — R10.30 LOWER ABDOMINAL PAIN: ICD-10-CM

## 2023-02-16 LAB
ABSOLUTE EOS #: 0.26 K/UL (ref 0–0.44)
ABSOLUTE IMMATURE GRANULOCYTE: <0.03 K/UL (ref 0–0.3)
ABSOLUTE LYMPH #: 1.38 K/UL (ref 1.1–3.7)
ABSOLUTE MONO #: 0.39 K/UL (ref 0.1–1.2)
ALBUMIN SERPL-MCNC: 4.7 G/DL (ref 3.5–5.2)
ALBUMIN/GLOBULIN RATIO: 1.6 (ref 1–2.5)
ALP SERPL-CCNC: 57 U/L (ref 35–104)
ALT SERPL-CCNC: 12 U/L (ref 5–33)
ANION GAP SERPL CALCULATED.3IONS-SCNC: 13 MMOL/L (ref 9–17)
AST SERPL-CCNC: 14 U/L
BASOPHILS # BLD: 1 % (ref 0–2)
BASOPHILS ABSOLUTE: 0.03 K/UL (ref 0–0.2)
BILIRUB SERPL-MCNC: 0.5 MG/DL (ref 0.3–1.2)
BUN SERPL-MCNC: 9 MG/DL (ref 6–20)
CALCIUM SERPL-MCNC: 9.2 MG/DL (ref 8.6–10.4)
CANDIDA SPECIES, DNA PROBE: NEGATIVE
CHLORIDE SERPL-SCNC: 105 MMOL/L (ref 98–107)
CO2 SERPL-SCNC: 23 MMOL/L (ref 20–31)
CREAT SERPL-MCNC: 0.82 MG/DL (ref 0.5–0.9)
EOSINOPHILS RELATIVE PERCENT: 5 % (ref 1–4)
GARDNERELLA VAGINALIS, DNA PROBE: NEGATIVE
GFR SERPL CREATININE-BSD FRML MDRD: >60 ML/MIN/1.73M2
GLUCOSE SERPL-MCNC: 83 MG/DL (ref 70–99)
HCT VFR BLD AUTO: 42.3 % (ref 36.3–47.1)
HGB BLD-MCNC: 13.4 G/DL (ref 11.9–15.1)
IMMATURE GRANULOCYTES: 0 %
LYMPHOCYTES # BLD: 25 % (ref 24–43)
MCH RBC QN AUTO: 30.3 PG (ref 25.2–33.5)
MCHC RBC AUTO-ENTMCNC: 31.7 G/DL (ref 28.4–34.8)
MCV RBC AUTO: 95.7 FL (ref 82.6–102.9)
MICROORGANISM SPEC CULT: NORMAL
MONOCYTES # BLD: 7 % (ref 3–12)
NRBC AUTOMATED: 0 PER 100 WBC
PDW BLD-RTO: 12.6 % (ref 11.8–14.4)
PLATELET # BLD AUTO: 252 K/UL (ref 138–453)
PMV BLD AUTO: 12.5 FL (ref 8.1–13.5)
POC CREATININE: 0.8 MG/DL (ref 0.6–1.4)
POTASSIUM SERPL-SCNC: 3.9 MMOL/L (ref 3.7–5.3)
PROT SERPL-MCNC: 7.6 G/DL (ref 6.4–8.3)
RBC # BLD: 4.42 M/UL (ref 3.95–5.11)
SEG NEUTROPHILS: 62 % (ref 36–65)
SEGMENTED NEUTROPHILS ABSOLUTE COUNT: 3.56 K/UL (ref 1.5–8.1)
SODIUM SERPL-SCNC: 141 MMOL/L (ref 135–144)
SOURCE: NORMAL
SPECIMEN DESCRIPTION: NORMAL
TRICHOMONAS VAGINALIS DNA: NEGATIVE
WBC # BLD AUTO: 5.6 K/UL (ref 3.5–11.3)

## 2023-02-16 PROCEDURE — 82565 ASSAY OF CREATININE: CPT

## 2023-02-16 PROCEDURE — 74177 CT ABD & PELVIS W/CONTRAST: CPT

## 2023-02-16 PROCEDURE — 6360000004 HC RX CONTRAST MEDICATION: Performed by: PHYSICIAN ASSISTANT

## 2023-02-16 PROCEDURE — 2500000003 HC RX 250 WO HCPCS: Performed by: PHYSICIAN ASSISTANT

## 2023-02-16 PROCEDURE — 2580000003 HC RX 258: Performed by: PHYSICIAN ASSISTANT

## 2023-02-16 RX ORDER — 0.9 % SODIUM CHLORIDE 0.9 %
80 INTRAVENOUS SOLUTION INTRAVENOUS ONCE
Status: COMPLETED | OUTPATIENT
Start: 2023-02-16 | End: 2023-02-16

## 2023-02-16 RX ORDER — SODIUM CHLORIDE 0.9 % (FLUSH) 0.9 %
10 SYRINGE (ML) INJECTION PRN
Status: DISCONTINUED | OUTPATIENT
Start: 2023-02-16 | End: 2023-02-19 | Stop reason: HOSPADM

## 2023-02-16 RX ADMIN — IOPAMIDOL 75 ML: 755 INJECTION, SOLUTION INTRAVENOUS at 10:04

## 2023-02-16 RX ADMIN — SODIUM CHLORIDE, PRESERVATIVE FREE 10 ML: 5 INJECTION INTRAVENOUS at 10:04

## 2023-02-16 RX ADMIN — SODIUM CHLORIDE 80 ML: 9 INJECTION, SOLUTION INTRAVENOUS at 10:04

## 2023-02-16 RX ADMIN — BARIUM SULFATE 450 ML: 20 SUSPENSION ORAL at 10:04

## 2023-03-03 ENCOUNTER — HOSPITAL ENCOUNTER (OUTPATIENT)
Age: 43
Setting detail: SPECIMEN
Discharge: HOME | End: 2023-03-03

## 2023-03-03 ENCOUNTER — OFFICE VISIT (OUTPATIENT)
Dept: OBGYN CLINIC | Age: 43
End: 2023-03-03

## 2023-03-03 VITALS
SYSTOLIC BLOOD PRESSURE: 107 MMHG | WEIGHT: 181.2 LBS | DIASTOLIC BLOOD PRESSURE: 66 MMHG | HEIGHT: 66 IN | BODY MASS INDEX: 29.12 KG/M2

## 2023-03-03 DIAGNOSIS — Z12.31 ENCOUNTER FOR SCREENING MAMMOGRAM FOR BREAST CANCER: ICD-10-CM

## 2023-03-03 DIAGNOSIS — N93.9 ABNORMAL UTERINE BLEEDING (AUB): Primary | ICD-10-CM

## 2023-03-03 DIAGNOSIS — N93.9 ABNORMAL UTERINE BLEEDING (AUB): ICD-10-CM

## 2023-03-03 DIAGNOSIS — N89.8 VAGINAL IRRITATION: ICD-10-CM

## 2023-03-03 DIAGNOSIS — R10.2 PELVIC PAIN: ICD-10-CM

## 2023-03-03 ASSESSMENT — ENCOUNTER SYMPTOMS
RESPIRATORY NEGATIVE: 1
SHORTNESS OF BREATH: 0
COUGH: 0
ALLERGIC/IMMUNOLOGIC NEGATIVE: 1
ABDOMINAL PAIN: 1
BACK PAIN: 0
ABDOMINAL DISTENTION: 0
CONSTIPATION: 1

## 2023-03-03 NOTE — PROGRESS NOTES
[address] [facility] - [address] [address]  Dept: [phone]  Dept Fax: [phone]         3/3/2023  Valeriy Pelaez       Chief Complaint  Chief Complaint   Patient presents with    Establish Care     Last seen Dr Sheila Quick 19 last pap 18-WNL     Vaginal Itching    Menstrual Problem     BTB     Pelvic Pain     Seen 2/15/23 at [facility] vaginal culture negative CT done ovarian cyst & fibroids     . Blood pressure 107/66, height 5' 6\" (1.676 m), weight 181 lb 3.2 oz (82.2 kg), last menstrual period 2023, not currently breastfeeding. HPI:     Valeriy Pelaez  1980 is a 39 y.o.  here today due to complaints of vaginal bleeding and irritation. She typically has regular cycles but they have been heavy lately for the first couple of days. For the past couple of months she has been having intermenstrual spotting. She had a couple of days of vaginal burning but that has resolved. She did a self swab at Urgent Care which was negative. She has seen Dr. Sheila Quick in the past and is scheduled for annual exam with her in May. She was last seen May 2019. At that time she discontinued Depo use. Her last pap was 2018  was was NIL, hpv was not performed. She had a CT on 2023 for abdominal pain, bloating and bleeding. There was a slightly heterogenous appearance of the uterus with a 3cm probable fibroid. An involuting left ovarian follicle and a 3.9 cm right ovarial cyst.  The some abnormalities with the small bowel suggesting possible internal hernia a narrowing of the splenic flexure with fecal stasis and a barium enema was suggested. She does not have a pcp but has a new patient appointment scheduled. She had a normal TSH 2021.        OB History    Para Term  AB Living   3 3 3 0 0 3   SAB IAB Ectopic Molar Multiple Live Births   0 0 0 0 0 3      # Outcome Date GA Lbr Nehemias/2nd Weight Sex Delivery Anes PTL Lv   3 Term         LEEANNA   2 Term         LEEANNA   1 Term         LEEANNA       History reviewed. No pertinent past medical history. Past Surgical History:   Procedure Laterality Date    APPENDECTOMY      WISDOM TOOTH EXTRACTION         Family History   Problem Relation Age of Onset    Hypertension Mother     Heart Disease Maternal Grandmother     Heart Surgery Maternal Grandmother         3 open heart surgeries    Stroke Maternal Grandfather        Social History     Socioeconomic History    Marital status:      Spouse name: Not on file    Number of children: Not on file    Years of education: Not on file    Highest education level: Not on file   Occupational History    Not on file   Tobacco Use    Smoking status: Never    Smokeless tobacco: Never   Vaping Use    Vaping Use: Never used   Substance and Sexual Activity    Alcohol use: Yes    Drug use: No    Sexual activity: Yes     Partners: Male     Birth control/protection: Condom   Other Topics Concern    Not on file   Social History Narrative    Not on file     Social Determinants of Health     Financial Resource Strain: Not on file   Food Insecurity: Not on file   Transportation Needs: Not on file   Physical Activity: Not on file   Stress: Not on file   Social Connections: Not on file   Intimate Partner Violence: Not on file   Housing Stability: Not on file       No data recorded     **If either question is answered in a  positive fashion then complete the PHQ9 Scoring Evaluation and make the appropriate referral**    MEDICATIONS:  Current Outpatient Medications   Medication Sig Dispense Refill    polyethylene glycol (GLYCOLAX) 17 g packet Take 17 g by mouth daily as needed for Constipation      cetirizine (ZYRTEC) 10 MG tablet Take 10 mg by mouth daily      docusate sodium (COLACE) 100 MG capsule Take 100 mg by mouth 2 times daily       No current facility-administered medications for this visit.          ALLERGIES:  Allergies as of 03/03/2023 - Fully Reviewed 03/03/2023   Allergen Reaction Noted    Pcn [penicillins] Hives 01/04/2017         REVIEW OF SYSTEMS:        Review of Systems   Constitutional:  Positive for fatigue. Negative for activity change and appetite change. HENT: Negative. Respiratory: Negative. Negative for cough and shortness of breath. Cardiovascular: Negative. Gastrointestinal:  Positive for abdominal pain and constipation. Negative for abdominal distention. Endocrine: Negative. Genitourinary:  Positive for menstrual problem and pelvic pain (right sided). Musculoskeletal:  Negative for arthralgias and back pain. Skin: Negative. Allergic/Immunologic: Negative. Neurological:  Negative for dizziness and light-headedness. Physical Exam  Constitutional:       Appearance: Normal appearance. Cardiovascular:      Rate and Rhythm: Normal rate and regular rhythm. Pulmonary:      Effort: Pulmonary effort is normal.      Breath sounds: Normal breath sounds. Genitourinary:     General: Normal vulva. Vagina: Normal.      Cervix: Normal.      Uterus: Normal. Not enlarged and not tender. Adnexa: Right adnexa normal and left adnexa normal.   Musculoskeletal:         General: Normal range of motion. Cervical back: Neck supple. Skin:     General: Skin is warm and dry. Neurological:      Mental Status: She is alert. EMB was performed. The cervix was cleaned with betadine and then the anterior lip was stabilized with a tenaculum. A pipelle was inserted easily to 8 and a moderate tissue sample was obtained. There was no bleeding and the patient tolerated it well. 8        Assessment/Plan   Diagnosis Orders   1. Abnormal uterine bleeding (AUB)  Vaginitis DNA Probe    US PELVIS COMPLETE    PAP SMEAR    Surgical Pathology    NC ENDOMETRIAL BX W/WO ENDOCERVIX BX W/O DILAT SPX      2. Vaginal irritation  Vaginitis DNA Probe      3. Pelvic pain  US PELVIS COMPLETE      4.  Encounter for screening mammogram for breast cancer  TERRANCE DIGITAL SCREEN W OR WO CAD BILATERAL        1. Abnormal uterine bleeding (AUB)  -     Vaginitis DNA Probe; Future  -     US PELVIS COMPLETE; Future  -     PAP SMEAR; Future  -     Surgical Pathology; Future  -     DC ENDOMETRIAL BX W/WO ENDOCERVIX BX W/O DILAT SPX; Future  2. Vaginal irritation  -     Vaginitis DNA Probe; Future  3. Pelvic pain  -     US PELVIS COMPLETE; Future  4. Encounter for screening mammogram for breast cancer  -     TERRANCE DIGITAL SCREEN W OR WO CAD BILATERAL; Future   She understands that her issues may be multifactoral and we will discuss her options when results are available.   Please keep appointment with pcp        Return in about 11 weeks (around 5/19/2023) for annual exam.        Electronically signed by HADLEY Wolf CNP 3/3/2023 12:05 PM

## 2023-03-03 NOTE — PROGRESS NOTES
Chaperone for Intimate Exam  Chaperone was offered and accepted as part of the rooming process.   Chaperone: Alejandra Vazquez CMA

## 2023-03-04 LAB
CANDIDA SPECIES, DNA PROBE: NEGATIVE
GARDNERELLA VAGINALIS, DNA PROBE: NEGATIVE
SOURCE: NORMAL
TRICHOMONAS VAGINALIS DNA: NEGATIVE

## 2023-03-06 ENCOUNTER — HOSPITAL ENCOUNTER (OUTPATIENT)
Dept: ULTRASOUND IMAGING | Facility: CLINIC | Age: 43
Discharge: HOME | End: 2023-03-08
Payer: COMMERCIAL

## 2023-03-06 DIAGNOSIS — R10.2 PELVIC PAIN: ICD-10-CM

## 2023-03-06 DIAGNOSIS — N93.9 ABNORMAL UTERINE BLEEDING (AUB): ICD-10-CM

## 2023-03-06 PROCEDURE — 76856 US EXAM PELVIC COMPLETE: CPT

## 2023-04-07 PROBLEM — J30.9 ALLERGIC RHINITIS: Status: ACTIVE | Noted: 2023-04-07

## 2023-05-18 NOTE — PROGRESS NOTES
[address] [facility] - [address] [address]  Dept: [phone]    Chief complaint:   Chief Complaint   Patient presents with    Gynecologic Exam     Last pap 3/3/23 WNL - HPV Last homa unknown        History Present Illness: Rosa Gonzalez is a 40 yo female who presents for her annual exam.  She says her periods are getting heavier as she is getting older. She says that she feels fatigue for the first couple of days with occasional cramping. She says the pain isn't too bad though. She says she feels like she has a bump inside the vagina. She says she feels some pressure and isn't sure if it is still there. She did have some breakthrough bleeding that she saw the NP for and says that is better. She denies any vaginal discharge or irritation. She denies any dyspareunia with her . She denies any bladder issues. She does have issues with constipation. Current Medications (OTC/Herbal):   Current Outpatient Medications   Medication Sig Dispense Refill    polyethylene glycol (GLYCOLAX) 17 g packet Take 17 g by mouth daily as needed for Constipation      cetirizine (ZYRTEC) 10 MG tablet Take 1 tablet by mouth daily      docusate sodium (COLACE) 100 MG capsule Take 1 capsule by mouth 2 times daily       No current facility-administered medications for this visit. Allergies:    Allergies   Allergen Reactions    Pcn [Penicillins] Hives     Past Medical History:   Past Medical History:   Diagnosis Date    Pre-eclampsia     first pregnancy     Past Surgical History:   Past Surgical History:   Procedure Laterality Date    APPENDECTOMY      ENDOMETRIAL BIOPSY  2023    WISDOM TOOTH EXTRACTION       Obstetric History:   3  Para 3  Gynecologic History: LMP 23   Menarche 11  Duration 5-7 d    Interval q  26-27 d  Tampons/Pads in a day: 4-6  Last Pap: 3/3/23      Any history of abnormal paps no

## 2023-05-19 ENCOUNTER — OFFICE VISIT (OUTPATIENT)
Dept: OBGYN CLINIC | Age: 43
End: 2023-05-19

## 2023-05-19 VITALS
BODY MASS INDEX: 31.54 KG/M2 | HEART RATE: 64 BPM | WEIGHT: 178 LBS | DIASTOLIC BLOOD PRESSURE: 83 MMHG | SYSTOLIC BLOOD PRESSURE: 125 MMHG | HEIGHT: 63 IN

## 2023-05-19 DIAGNOSIS — N81.6 POSTERIOR VAGINAL WALL PROLAPSE: ICD-10-CM

## 2023-05-19 DIAGNOSIS — Z01.419 ENCOUNTER FOR WELL WOMAN EXAM WITH ROUTINE GYNECOLOGICAL EXAM: Primary | ICD-10-CM

## 2023-05-19 DIAGNOSIS — Z12.31 SCREENING MAMMOGRAM FOR BREAST CANCER: ICD-10-CM

## 2023-05-19 ASSESSMENT — ENCOUNTER SYMPTOMS
ABDOMINAL PAIN: 0
COUGH: 0
CONSTIPATION: 1
BACK PAIN: 0
SHORTNESS OF BREATH: 0

## 2023-09-07 ENCOUNTER — HOSPITAL ENCOUNTER (OUTPATIENT)
Age: 43
Discharge: HOME | End: 2023-09-07
Payer: COMMERCIAL

## 2023-09-07 DIAGNOSIS — R63.5 WEIGHT GAIN: ICD-10-CM

## 2023-09-07 DIAGNOSIS — K59.00 CONSTIPATION, UNSPECIFIED CONSTIPATION TYPE: ICD-10-CM

## 2023-09-07 DIAGNOSIS — Z76.89 ENCOUNTER TO ESTABLISH CARE: ICD-10-CM

## 2023-09-07 LAB
CHOLEST SERPL-MCNC: 173 MG/DL
CHOLESTEROL/HDL RATIO: 4.7
GLUCOSE SERPL-MCNC: 91 MG/DL (ref 70–99)
HDLC SERPL-MCNC: 37 MG/DL
LDLC SERPL CALC-MCNC: 122 MG/DL (ref 0–130)
PATIENT FASTING?: ABNORMAL
TRIGL SERPL-MCNC: 69 MG/DL
TSH SERPL DL<=0.05 MIU/L-ACNC: 1.75 UIU/ML (ref 0.3–5)

## 2023-09-07 PROCEDURE — 36415 COLL VENOUS BLD VENIPUNCTURE: CPT

## 2023-09-07 PROCEDURE — 80061 LIPID PANEL: CPT

## 2023-09-07 PROCEDURE — 82947 ASSAY GLUCOSE BLOOD QUANT: CPT

## 2023-09-07 PROCEDURE — 84443 ASSAY THYROID STIM HORMONE: CPT

## 2023-09-27 ENCOUNTER — TELEPHONE (OUTPATIENT)
Dept: FAMILY MEDICINE CLINIC | Age: 43
End: 2023-09-27

## 2023-11-22 ENCOUNTER — HOSPITAL ENCOUNTER (OUTPATIENT)
Age: 43
Discharge: HOME | End: 2023-11-22
Payer: COMMERCIAL

## 2023-11-22 LAB — CALCIUM SERPL-MCNC: 8.9 MG/DL (ref 8.6–10.4)

## 2023-11-22 PROCEDURE — 36415 COLL VENOUS BLD VENIPUNCTURE: CPT

## 2023-11-22 PROCEDURE — 82310 ASSAY OF CALCIUM: CPT

## 2023-11-22 PROCEDURE — 83516 IMMUNOASSAY NONANTIBODY: CPT

## 2023-11-29 LAB
GLIADIN IGG SER IA-ACNC: <0.4 U/ML
TTG IGA SER IA-ACNC: 0.5 U/ML

## 2024-04-30 ENCOUNTER — OFFICE VISIT (OUTPATIENT)
Dept: FAMILY MEDICINE CLINIC | Age: 44
End: 2024-04-30
Payer: COMMERCIAL

## 2024-04-30 VITALS
HEART RATE: 73 BPM | HEIGHT: 63 IN | BODY MASS INDEX: 32.78 KG/M2 | DIASTOLIC BLOOD PRESSURE: 75 MMHG | SYSTOLIC BLOOD PRESSURE: 121 MMHG | WEIGHT: 185 LBS

## 2024-04-30 DIAGNOSIS — M77.01 EPICONDYLITIS ELBOW, MEDIAL, RIGHT: Primary | ICD-10-CM

## 2024-04-30 DIAGNOSIS — K59.09 CHRONIC CONSTIPATION: ICD-10-CM

## 2024-04-30 PROCEDURE — 99214 OFFICE O/P EST MOD 30 MIN: CPT | Performed by: NURSE PRACTITIONER

## 2024-04-30 RX ORDER — IBUPROFEN 800 MG/1
800 TABLET ORAL 2 TIMES DAILY PRN
Qty: 60 TABLET | Refills: 0 | Status: SHIPPED | OUTPATIENT
Start: 2024-04-30

## 2024-04-30 SDOH — ECONOMIC STABILITY: FOOD INSECURITY: WITHIN THE PAST 12 MONTHS, YOU WORRIED THAT YOUR FOOD WOULD RUN OUT BEFORE YOU GOT MONEY TO BUY MORE.: NEVER TRUE

## 2024-04-30 SDOH — ECONOMIC STABILITY: FOOD INSECURITY: WITHIN THE PAST 12 MONTHS, THE FOOD YOU BOUGHT JUST DIDN'T LAST AND YOU DIDN'T HAVE MONEY TO GET MORE.: NEVER TRUE

## 2024-04-30 SDOH — ECONOMIC STABILITY: INCOME INSECURITY: HOW HARD IS IT FOR YOU TO PAY FOR THE VERY BASICS LIKE FOOD, HOUSING, MEDICAL CARE, AND HEATING?: NOT HARD AT ALL

## 2024-04-30 SDOH — ECONOMIC STABILITY: HOUSING INSECURITY
IN THE LAST 12 MONTHS, WAS THERE A TIME WHEN YOU DID NOT HAVE A STEADY PLACE TO SLEEP OR SLEPT IN A SHELTER (INCLUDING NOW)?: NO

## 2024-04-30 ASSESSMENT — ENCOUNTER SYMPTOMS
ABDOMINAL PAIN: 0
CHEST TIGHTNESS: 0
COLOR CHANGE: 0
SHORTNESS OF BREATH: 0
DIARRHEA: 0
CONSTIPATION: 1

## 2024-04-30 ASSESSMENT — PATIENT HEALTH QUESTIONNAIRE - PHQ9
SUM OF ALL RESPONSES TO PHQ QUESTIONS 1-9: 0
2. FEELING DOWN, DEPRESSED OR HOPELESS: NOT AT ALL
SUM OF ALL RESPONSES TO PHQ9 QUESTIONS 1 & 2: 0
SUM OF ALL RESPONSES TO PHQ QUESTIONS 1-9: 0
1. LITTLE INTEREST OR PLEASURE IN DOING THINGS: NOT AT ALL

## 2024-05-15 ENCOUNTER — HOSPITAL ENCOUNTER (OUTPATIENT)
Dept: MAMMOGRAPHY | Age: 44
Discharge: HOME | End: 2024-05-17
Payer: COMMERCIAL

## 2024-05-15 DIAGNOSIS — Z12.31 SCREENING MAMMOGRAM FOR BREAST CANCER: ICD-10-CM

## 2024-05-15 PROCEDURE — 77063 BREAST TOMOSYNTHESIS BI: CPT

## 2025-07-30 LAB
CHOLEST SERPL-MCNC: 176 MG/DL (ref 0–199)
CHOLESTEROL/HDL RATIO: 4.8
GLUCOSE SERPL-MCNC: 92 MG/DL (ref 74–99)
HDLC SERPL-MCNC: 37 MG/DL
LDLC SERPL CALC-MCNC: 120 MG/DL (ref 0–100)
PATIENT FASTING?: YES
TRIGL SERPL-MCNC: 97 MG/DL
VLDLC SERPL CALC-MCNC: 19 MG/DL (ref 1–30)